# Patient Record
Sex: FEMALE | Race: WHITE
[De-identification: names, ages, dates, MRNs, and addresses within clinical notes are randomized per-mention and may not be internally consistent; named-entity substitution may affect disease eponyms.]

---

## 2020-07-22 ENCOUNTER — HOSPITAL ENCOUNTER (OUTPATIENT)
Dept: HOSPITAL 95 - LAB SHORT | Age: 80
Discharge: HOME | End: 2020-07-22
Attending: PHYSICIAN ASSISTANT
Payer: MEDICARE

## 2020-07-22 DIAGNOSIS — N39.0: Primary | ICD-10-CM

## 2022-06-08 ENCOUNTER — HOSPITAL ENCOUNTER (INPATIENT)
Dept: HOSPITAL 95 - ER | Age: 82
LOS: 3 days | Discharge: HOME | DRG: 964 | End: 2022-06-11
Attending: STUDENT IN AN ORGANIZED HEALTH CARE EDUCATION/TRAINING PROGRAM | Admitting: STUDENT IN AN ORGANIZED HEALTH CARE EDUCATION/TRAINING PROGRAM
Payer: MEDICARE

## 2022-06-08 VITALS — BODY MASS INDEX: 26.29 KG/M2 | HEIGHT: 64 IN | WEIGHT: 153.99 LBS

## 2022-06-08 DIAGNOSIS — S22.41XA: ICD-10-CM

## 2022-06-08 DIAGNOSIS — Z79.899: ICD-10-CM

## 2022-06-08 DIAGNOSIS — S06.6X0A: Primary | ICD-10-CM

## 2022-06-08 DIAGNOSIS — I10: ICD-10-CM

## 2022-06-08 DIAGNOSIS — Z88.7: ICD-10-CM

## 2022-06-08 DIAGNOSIS — Z88.8: ICD-10-CM

## 2022-06-08 DIAGNOSIS — E78.5: ICD-10-CM

## 2022-06-08 DIAGNOSIS — Z79.82: ICD-10-CM
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
## 2022-06-08 DIAGNOSIS — D72.829: ICD-10-CM

## 2022-06-08 DIAGNOSIS — S27.0XXA: ICD-10-CM

## 2022-06-08 DIAGNOSIS — Z88.2: ICD-10-CM

## 2022-06-08 DIAGNOSIS — Z88.0: ICD-10-CM

## 2022-06-08 LAB
ANION GAP SERPL CALCULATED.4IONS-SCNC: 8 MMOL/L (ref 6–16)
BASOPHILS # BLD AUTO: 0.09 K/MM3 (ref 0–0.23)
BASOPHILS NFR BLD AUTO: 1 % (ref 0–2)
BUN SERPL-MCNC: 35 MG/DL (ref 8–24)
CALCIUM SERPL-MCNC: 9.7 MG/DL (ref 8.5–10.1)
CHLORIDE SERPL-SCNC: 106 MMOL/L (ref 98–108)
CO2 SERPL-SCNC: 26 MMOL/L (ref 21–32)
CREAT SERPL-MCNC: 0.66 MG/DL (ref 0.4–1)
DEPRECATED RDW RBC AUTO: 45 FL (ref 35.1–46.3)
EOSINOPHIL # BLD AUTO: 0.08 K/MM3 (ref 0–0.68)
EOSINOPHIL NFR BLD AUTO: 0 % (ref 0–6)
ERYTHROCYTE [DISTWIDTH] IN BLOOD BY AUTOMATED COUNT: 13.4 % (ref 11.7–14.2)
GLUCOSE SERPL-MCNC: 191 MG/DL (ref 70–99)
HCT VFR BLD AUTO: 46.1 % (ref 33–51)
HGB BLD-MCNC: 15.2 G/DL (ref 11.5–16)
IMM GRANULOCYTES # BLD AUTO: 0.19 K/MM3 (ref 0–0.1)
IMM GRANULOCYTES NFR BLD AUTO: 1 % (ref 0–1)
KETONES UR STRIP-MCNC: (no result) MG/DL
LYMPHOCYTES # BLD AUTO: 1.45 K/MM3 (ref 0.84–5.2)
LYMPHOCYTES NFR BLD AUTO: 8 % (ref 21–46)
MCHC RBC AUTO-ENTMCNC: 33 G/DL (ref 31.5–36.5)
MCV RBC AUTO: 91 FL (ref 80–100)
MONOCYTES # BLD AUTO: 0.89 K/MM3 (ref 0.16–1.47)
MONOCYTES NFR BLD AUTO: 5 % (ref 4–13)
NEUTROPHILS # BLD AUTO: 16.66 K/MM3 (ref 1.96–9.15)
NEUTROPHILS NFR BLD AUTO: 86 % (ref 41–73)
NRBC # BLD AUTO: 0 K/MM3 (ref 0–0.02)
NRBC BLD AUTO-RTO: 0 /100 WBC (ref 0–0.2)
PLATELET # BLD AUTO: 264 K/MM3 (ref 150–400)
POTASSIUM SERPL-SCNC: 3.4 MMOL/L (ref 3.5–5.5)
RBC #/AREA URNS HPF: (no result) /HPF (ref 0–2)
SODIUM SERPL-SCNC: 140 MMOL/L (ref 136–145)
SP GR SPEC: 1.02 (ref 1–1.02)
UROBILINOGEN UR STRIP-MCNC: (no result) MG/DL
WBC #/AREA URNS HPF: (no result) /HPF (ref 0–5)

## 2022-06-08 PROCEDURE — 0W9930Z DRAINAGE OF RIGHT PLEURAL CAVITY WITH DRAINAGE DEVICE, PERCUTANEOUS APPROACH: ICD-10-PCS | Performed by: STUDENT IN AN ORGANIZED HEALTH CARE EDUCATION/TRAINING PROGRAM

## 2022-06-08 PROCEDURE — A9270 NON-COVERED ITEM OR SERVICE: HCPCS

## 2022-06-09 LAB
ANION GAP SERPL CALCULATED.4IONS-SCNC: 6 MMOL/L (ref 6–16)
BASOPHILS # BLD AUTO: 0.05 K/MM3 (ref 0–0.23)
BASOPHILS NFR BLD AUTO: 0 % (ref 0–2)
BUN SERPL-MCNC: 22 MG/DL (ref 8–24)
CALCIUM SERPL-MCNC: 8.9 MG/DL (ref 8.5–10.1)
CHLORIDE SERPL-SCNC: 105 MMOL/L (ref 98–108)
CO2 SERPL-SCNC: 28 MMOL/L (ref 21–32)
CREAT SERPL-MCNC: 0.58 MG/DL (ref 0.4–1)
DEPRECATED RDW RBC AUTO: 45 FL (ref 35.1–46.3)
EOSINOPHIL # BLD AUTO: 0.16 K/MM3 (ref 0–0.68)
EOSINOPHIL NFR BLD AUTO: 1 % (ref 0–6)
ERYTHROCYTE [DISTWIDTH] IN BLOOD BY AUTOMATED COUNT: 13.4 % (ref 11.7–14.2)
GLUCOSE SERPL-MCNC: 150 MG/DL (ref 70–99)
HCT VFR BLD AUTO: 42.6 % (ref 33–51)
HGB BLD-MCNC: 13.9 G/DL (ref 11.5–16)
IMM GRANULOCYTES # BLD AUTO: 0.08 K/MM3 (ref 0–0.1)
IMM GRANULOCYTES NFR BLD AUTO: 1 % (ref 0–1)
LYMPHOCYTES # BLD AUTO: 1.25 K/MM3 (ref 0.84–5.2)
LYMPHOCYTES NFR BLD AUTO: 9 % (ref 21–46)
MCHC RBC AUTO-ENTMCNC: 32.6 G/DL (ref 31.5–36.5)
MCV RBC AUTO: 90 FL (ref 80–100)
MONOCYTES # BLD AUTO: 0.74 K/MM3 (ref 0.16–1.47)
MONOCYTES NFR BLD AUTO: 6 % (ref 4–13)
NEUTROPHILS # BLD AUTO: 11.09 K/MM3 (ref 1.96–9.15)
NEUTROPHILS NFR BLD AUTO: 83 % (ref 41–73)
NRBC # BLD AUTO: 0 K/MM3 (ref 0–0.02)
NRBC BLD AUTO-RTO: 0 /100 WBC (ref 0–0.2)
PLATELET # BLD AUTO: 225 K/MM3 (ref 150–400)
POTASSIUM SERPL-SCNC: 3.5 MMOL/L (ref 3.5–5.5)
SODIUM SERPL-SCNC: 139 MMOL/L (ref 136–145)

## 2022-06-09 NOTE — NUR
PATIENT ADMITTED FROM ED IN STABLE CONDITION. LARGE BRUISE ON RIGHT SHOULDER
BLADE AND BACK OF HER HEAD HAS A LARGE PUPLE LUMP. CHEST TUBE TO RIGHT
ANTERIOR CHEST WALL. HOOKED TO 20CM OF SUCTION. NO CREPITUS OR AIR LEAK
OVERNIGHT. NEURO CHECKS ARE NEGATIVE. VOIDING ON BS COMMODE. O2/2L NC. NO C/O
PAIN OVERNIGHT.

## 2022-06-10 NOTE — NUR
PT IS A/OX4, PLEASANT AND COOPERATIVE. PT IS UP WITH MINIMAL ASSIST TO THE
BATHROOM AND THE CHAIR. THE PTS CHEST TUBE WAS REMIVED TODAY BY DR. HURST. THE
PT IS ON RA AT THIS TIME O2 SAT'S > 90%. PT DENIES ANY SOB. PT REPORTS MINIMAL
PAIN. THE PT SHOWS NO NEURO DEFICTS. CALL LIGHT IN REACH. EXPECT DISCHARGE
TOMORROW

## 2022-06-10 NOTE — NUR
PATIENT WITH VSS ON 2L/O2 NC OVERNIGHT. UP WITH SBA TO BEDSIDE COMMODE. CHEST
TUBE PATENT, HOOKED TO 20CM SUCTION. SCANT AMOUNT OF BLOODY DRAINAGE. NO SIGNS
OF AIR LEAK OR CREPITUS NOTED. PATIENT PASSED ALL NEURO CHECKS OVERNIGHT. nO
ACUTE EVENTS THIS NIGHT.

## 2022-06-11 NOTE — NUR
pt sitting up in bed awake a/ox3, pleasant and cooperative with care, follows
commands well, reports 5/10 pain to right chest and h/a, tylenol administered,
lungs are clear in upper fields, a bit dim in bases, resp even and unlabored,
no cough noted or reported, currently on 1 liter o2 for comfort, will trial
on r/a at this time, on cont oximeter, hrr, no edema noted, ppp+2, cap refill
<3sec, vs stable, afebrile, iv site is clear and patent, btx4, abd flat soft
nontender, voids without diff, skin has a gooseegg bump to head and chest tube
site to right anterior c.w. clear dressing in place, otherwise c/d/i, madelaine forte, call light in reach.

## 2022-06-11 NOTE — NUR
SHIFT SUMMARY:
 
THE PT IS A/OX4. SHE DOES USE THE CALL LIGHT WELL; SHE COULD BE INDEPENDENT,
BUT SHE USED 1L OF O2 DURING THE NOC SHIFT AND WAS INSTRUCTED TO CALL BEFORE
GETTING OOB. NO NEURO DEFICITS OR C/O PAIN THIS SHIFT. NO OTHER CHANGES TO
REPORT AND WE'LL CONTINUE TO MONITOR.

## 2022-06-11 NOTE — NUR
pt has been discharged to home, went over discharge instructions with her, she
verbalized understanding, iv removed intact, left via wheelchair with cna and
spouce with all belongings.

## 2022-06-23 ENCOUNTER — HOSPITAL ENCOUNTER (OUTPATIENT)
Dept: HOSPITAL 95 - LAB SHORT | Age: 82
Discharge: HOME | End: 2022-06-23
Attending: PHYSICIAN ASSISTANT
Payer: MEDICARE

## 2022-06-23 DIAGNOSIS — N39.0: Primary | ICD-10-CM

## 2024-10-03 ENCOUNTER — HOSPITAL ENCOUNTER (OUTPATIENT)
Dept: HOSPITAL 95 - ORSCSDS | Age: 84
Discharge: HOME | End: 2024-10-03
Attending: OPHTHALMOLOGY
Payer: MEDICARE

## 2024-10-03 VITALS — WEIGHT: 139.33 LBS | BODY MASS INDEX: 23.79 KG/M2 | HEIGHT: 64 IN

## 2024-10-03 VITALS — SYSTOLIC BLOOD PRESSURE: 139 MMHG | DIASTOLIC BLOOD PRESSURE: 71 MMHG

## 2024-10-03 DIAGNOSIS — E11.36: Primary | ICD-10-CM

## 2024-10-03 DIAGNOSIS — H25.813: ICD-10-CM

## 2024-10-03 DIAGNOSIS — Z79.899: ICD-10-CM

## 2024-10-03 DIAGNOSIS — I48.91: ICD-10-CM

## 2024-10-03 DIAGNOSIS — I10: ICD-10-CM

## 2024-10-03 PROCEDURE — V2632 POST CHMBR INTRAOCULAR LENS: HCPCS

## 2024-10-03 PROCEDURE — 08RJ3JZ REPLACEMENT OF RIGHT LENS WITH SYNTHETIC SUBSTITUTE, PERCUTANEOUS APPROACH: ICD-10-PCS | Performed by: OPHTHALMOLOGY

## 2024-10-03 NOTE — NUR
10/03/24 0852 Connie Sood
PT DRINKING WATER WITHOUT N/V.  DROVE PT HOME. NO COMPLICATIONS
OR COMPLAINTS

## 2024-10-10 ENCOUNTER — HOSPITAL ENCOUNTER (OUTPATIENT)
Dept: HOSPITAL 95 - ORSCSDS | Age: 84
Discharge: HOME | End: 2024-10-10
Attending: OPHTHALMOLOGY
Payer: MEDICARE

## 2024-10-10 VITALS — WEIGHT: 140.88 LBS | BODY MASS INDEX: 24.05 KG/M2 | HEIGHT: 64 IN

## 2024-10-10 VITALS — DIASTOLIC BLOOD PRESSURE: 95 MMHG | SYSTOLIC BLOOD PRESSURE: 119 MMHG

## 2024-10-10 DIAGNOSIS — Z79.899: ICD-10-CM

## 2024-10-10 DIAGNOSIS — I48.91: ICD-10-CM

## 2024-10-10 DIAGNOSIS — E11.36: Primary | ICD-10-CM

## 2024-10-10 DIAGNOSIS — I10: ICD-10-CM

## 2024-10-10 DIAGNOSIS — G47.33: ICD-10-CM

## 2024-10-10 DIAGNOSIS — H25.812: ICD-10-CM

## 2024-10-10 DIAGNOSIS — Z79.84: ICD-10-CM

## 2024-10-10 DIAGNOSIS — Z96.1: ICD-10-CM

## 2024-10-10 PROCEDURE — V2632 POST CHMBR INTRAOCULAR LENS: HCPCS

## 2024-10-10 PROCEDURE — 08RK3JZ REPLACEMENT OF LEFT LENS WITH SYNTHETIC SUBSTITUTE, PERCUTANEOUS APPROACH: ICD-10-PCS | Performed by: OPHTHALMOLOGY
